# Patient Record
Sex: MALE | Race: WHITE | NOT HISPANIC OR LATINO | Employment: FULL TIME | URBAN - METROPOLITAN AREA
[De-identification: names, ages, dates, MRNs, and addresses within clinical notes are randomized per-mention and may not be internally consistent; named-entity substitution may affect disease eponyms.]

---

## 2022-12-15 ENCOUNTER — HOSPITAL ENCOUNTER (EMERGENCY)
Facility: MEDICAL CENTER | Age: 47
End: 2022-12-15
Attending: EMERGENCY MEDICINE
Payer: COMMERCIAL

## 2022-12-15 VITALS
RESPIRATION RATE: 16 BRPM | HEART RATE: 84 BPM | WEIGHT: 202.82 LBS | DIASTOLIC BLOOD PRESSURE: 73 MMHG | TEMPERATURE: 97.2 F | SYSTOLIC BLOOD PRESSURE: 113 MMHG | OXYGEN SATURATION: 95 %

## 2022-12-15 DIAGNOSIS — J06.9 VIRAL URI WITH COUGH: ICD-10-CM

## 2022-12-15 LAB
FLUAV RNA SPEC QL NAA+PROBE: NEGATIVE
FLUBV RNA SPEC QL NAA+PROBE: NEGATIVE
RSV RNA SPEC QL NAA+PROBE: POSITIVE
SARS-COV-2 RNA RESP QL NAA+PROBE: DETECTED
SPECIMEN SOURCE: ABNORMAL

## 2022-12-15 PROCEDURE — 0241U HCHG SARS-COV-2 COVID-19 NFCT DS RESP RNA 4 TRGT MIC: CPT

## 2022-12-15 PROCEDURE — C9803 HOPD COVID-19 SPEC COLLECT: HCPCS | Performed by: EMERGENCY MEDICINE

## 2022-12-15 PROCEDURE — A9270 NON-COVERED ITEM OR SERVICE: HCPCS | Performed by: EMERGENCY MEDICINE

## 2022-12-15 PROCEDURE — 99283 EMERGENCY DEPT VISIT LOW MDM: CPT

## 2022-12-15 PROCEDURE — 700102 HCHG RX REV CODE 250 W/ 637 OVERRIDE(OP): Performed by: EMERGENCY MEDICINE

## 2022-12-15 RX ORDER — IBUPROFEN 600 MG/1
600 TABLET ORAL ONCE
Status: COMPLETED | OUTPATIENT
Start: 2022-12-15 | End: 2022-12-15

## 2022-12-15 RX ADMIN — IBUPROFEN 600 MG: 600 TABLET, FILM COATED ORAL at 15:54

## 2022-12-15 NOTE — ED TRIAGE NOTES
Chief Complaint   Patient presents with    Cough    Sore Throat     Patient ambulatory to triage with above complaint. Per patient his infant tested positive for covid 2 days ago. Symptoms started 2 days ago.   Baby with father. He states he is doing well..

## 2022-12-15 NOTE — DISCHARGE INSTRUCTIONS
You were seen in the Emergency Department for viral illness.    Please use tylenol or ibuprofen every 6 hours as needed for pain/fever.  Encourage fluid intake.    Please follow up with your primary care physician.    Return to the Emergency Department with persistent fevers greater than 100.4 for greater than 4-5 days, trouble breathing, persistent vomiting, decreased urine output, or other concerns.    You likely have a viral illness and may have COVID-19.   Testing is pending at this time and may take 1-2 days to return.  Please act as if these results are positive and self isolate until you receive the results. Make sure you still wear a mask, social distance and practice good hand hygiene no matter the result.     Please logon to [employer] for results. You can login or create an account for [employer] at [employer].     Therefore, COVID-19 is not ruled out and you will need to remain in home quarantine until all three of the following are true:  You are at least 5days from symptom onset  your symptoms are essentially resolved   you have been fever free for at least 24 hours.    You must wear a mask for a total of 10 days as you may be contagious for that long.    If you develop significant shortness of breath, meaning that it is difficult for you to walk even short distances without having to stop and catch your breath, or you become severely dizzy and this is persistent then please return to the emergency department.    For a more objective approach you can buy a pulse oximeter online. [employer] has multiple to chose from. If your oxygen saturation in these devices is persistently below 90% please return to the ER.

## 2022-12-15 NOTE — ED PROVIDER NOTES
ED Provider Note    Scribed for Radhika Galvez M.D. by Martha Jones. 12/15/2022  3:26 PM    Means of arrival: Walk-In  History of obtained from: Patient  History limited by: None    CHIEF COMPLAINT  Chief Complaint   Patient presents with    Cough    Sore Throat       HPI  William Chiang is a 47 y.o. male who presents to the Emergency Department for evaluation of acute cough onset two days ago. Patient reports that his son tested positive for COVID two days ago. Patient believes that he may also have it, prompting him to present to the ED today for further evaluation. Patient has associated sore throat, fever, and shortness of breath. Denies any diarrhea or chest pain. No alleviating or exacerbating factors reported. Patient has not received any vaccinations for COVID or flu. Drug allergies to Advair Diskus.     REVIEW OF SYSTEMS  Pertinent positives include cough, sore throat, fever, and shortness of breath. Pertinent negative include diarrhea or chest pain. All other systems are negative.     PAST MEDICAL HISTORY   None noted    SOCIAL HISTORY  Social History     Tobacco Use    Smoking status: Never    Smokeless tobacco: Never   Vaping Use    Vaping Use: Every day    Substances: Nicotine   Substance and Sexual Activity    Alcohol use: Never    Drug use: Never       SURGICAL HISTORY  patient denies any surgical history    CURRENT MEDICATIONS  Home Medications       Reviewed by Franny Babin R.N. (Registered Nurse) on 12/15/22 at 1502  Med List Status: Complete     Medication Last Dose Status        Patient Apollo Taking any Medications                           ALLERGIES  Allergies   Allergen Reactions    Advair Diskus      Hives         PHYSICAL EXAM  VITAL SIGNS: /67   Pulse 95   Temp 37.8 °C (100 °F) (Temporal)   Resp 18   Wt 92 kg (202 lb 13.2 oz)   SpO2 96%    Constitutional: Nontoxic appearing, alert in no apparent distress.  HENT: Normocephalic, Atraumatic. Bilateral external ears normal.  Nose normal. Moist mucous membranes.  Neck: Supple, full range of motion.  Eyes: Pupils are equal and reactive. Conjunctiva normal.   Heart: Regular rate and rhythm. No murmurs.    Lungs: No respiratory distress.  Normal work of breathing.  Clear to auscultation bilaterally.  Abdomen:  Soft, no distention. No tenderness to palpation  Skin: Warm, Dry. No rash.   Musculoskeletal: Atraumatic, no deformities noted.   Neurologic: Alert and oriented. Moving all extremities spontaneously  Psychiatric: Affect normal, Mood normal. Appears appropriate and not intoxicated.       DIAGNOSTIC STUDIES  LABS  Personally reviewed by me  Lab work still pending at this time.       ED COURSE  Vitals:    12/15/22 1458 12/15/22 1500   BP: 113/67    Pulse: 95    Resp: 18    Temp: 37.8 °C (100 °F)    TempSrc: Temporal    SpO2: 96%    Weight:  92 kg (202 lb 13.2 oz)         Medications administered:  Medications   ibuprofen (MOTRIN) tablet 600 mg (has no administration in time range)       MEDICAL DECISION MAKING  3:26 PM Patient seen and examined at bedside. The patient presents with cough for two days after her son tested positive for COVID. She is well appearing with reassuring vitals other than mild tachycardia. No hypoxia or respiratory distress.  History and exam not concerning for meningitis, strep pharyngitis, acute otitis media, pneumonia. No evidence of dehydration on exam. Patient requesting viral testing for work which is pending at this time.  Plan to discharge home with recommendations on symptomatic care for likely viral illness. Patient understands plan of care and strict return precautions for changing or worsening symptoms.      The patient is referred to a primary physician for blood pressure management, diabetic screening, and for all other preventative health concerns.    DISPOSITION:  Patient will be discharged home in stable condition.    FOLLOW UP:  [facility]  [address] [facility]  [address]  [phone]  Call   to establish primary care physician    [facility], Emergency Dept  [address] [facility] [address] [phone]    If symptoms worsen      IMPRESSION  (J06.9) Viral URI with cough    The patient is referred to a primary physician for blood pressure management, diabetic screening, and for all other preventative health concerns.       Martha ADDISON (Scribe), am scribing for, and in the presence of, Radhika Galvez M.D..    Electronically signed by: Martha Jones (Scribe), 12/15/2022    IRadhika M.D. personally performed the services described in this documentation, as scribed by Martha Jones in my presence, and it is both accurate and complete.      The note accurately reflects work and decisions made by me.  Radhika Galvez M.D.  12/16/2022  3:41 PM

## 2022-12-16 NOTE — ED NOTES
Pt sitting upright in chair in no obvious distress at this time. Discharge information and instructions given/discussed with Pt. Pt acknowledged information. [facility]